# Patient Record
Sex: FEMALE | Race: WHITE | Employment: PART TIME | URBAN - METROPOLITAN AREA
[De-identification: names, ages, dates, MRNs, and addresses within clinical notes are randomized per-mention and may not be internally consistent; named-entity substitution may affect disease eponyms.]

---

## 2018-01-03 PROBLEM — M17.12 OSTEOARTHRITIS OF LEFT KNEE: Status: ACTIVE | Noted: 2018-01-03

## 2019-05-09 ENCOUNTER — APPOINTMENT (OUTPATIENT)
Dept: PHYSICAL THERAPY | Age: 64
End: 2019-05-09

## 2019-05-14 ENCOUNTER — HOSPITAL ENCOUNTER (OUTPATIENT)
Dept: PHYSICAL THERAPY | Age: 64
Discharge: HOME | End: 2019-05-14
Payer: COMMERCIAL

## 2019-05-14 PROCEDURE — 97035 APP MDLTY 1+ULTRASOUND EA 15: CPT

## 2019-05-14 PROCEDURE — 97162 PT EVAL MOD COMPLEX 30 MIN: CPT

## 2019-05-14 NOTE — PROGRESS NOTES
[address] [facility] AT [address], [address] [place] [address] Road  Phone: [phone]   [phone]  PLAN OF CARE / [address] [place] [address] PHYSICAL THERAPY SERVICES  Patient Name: Tabby Hurtado : 1955   Medical   Diagnosis: Right ankle pain [M25.571] Treatment Diagnosis: Right ankle pain [M25.571]   Onset Date: 2019     Referral Source: Kristin Hdz MD Start of [facility]): 2019   Prior Hospitalization: See medical history Provider #: [id number]   Prior Level of Function: Independent   Comorbidities: History of Hepatitis C   Medications: Verified on Patient Summary List   The Plan of Care and following information is based on the information from the initial evaluation.   ===========================================================================================  Assessment / key information:  Patient is a 60year old female with chronic right ankle pain. Patient reports no VALERIE with pain and swelling just starting in February. Patient states that the pain and swelling comes and goes with no known reason. Patient works as a  and visitor . Patient lives in a 2 story dwelling with stairs inside and railings on both sides. Patient reports she likes to walk for exercise. Pain is located right medial ankle; described as sharp/shooting, ache with occasional burning  Current: 4/10; Worse: 7/10 - aggravated with walking long distances; Best: 0/10 - eased with rest, elevation. No numbness or tingling; History of left ankle twisting    Stance: Bilateral pes planus  Bilateral Hallux valgus L>R  Gait: Increased bilateral ER with medial heel whip  ROM: Full bilateral ankle  Strength: 5/5 bilateral ankle  Stability: positive anterior drawer to left;  Increased laxity to bilateral lateral ankle  Balance: Fair EO / Very poor EC  Slight increased inflammation to right medial ankle talocrural joint line; no redness or warmth noted. Patient issued written HEP for ankle stretching and strengthening exercises with balance challenges.      ===========================================================================================  History MEDIUM  Complexity : 1-2 comorbidities / personal factors will impact the outcome/ POC ; Examination MEDIUM Complexity : 3 Standardized tests and measures addressing body structure, function, activity limitation and / or participation in recreation ; Presentation MEDIUM Complexity : Evolving with changing characteristics ; Decision Making MEDIUM Complexity : FOTO score of 26-74; Complexity MEDIUM  Problem List: pain affecting function, impaired gait/ balance, decrease ADL/ functional abilitiies and decrease activity tolerance   Treatment Plan may include any combination of the following: Therapeutic exercise, Neuromuscular re-education, Physical agent/modality, Manual therapy, Patient education, Self Care training and Functional mobility training  Patient / Family readiness to learn indicated by: asking questions, trying to perform skills and interest  Persons(s) to be included in education: patient (P)  Barriers to Learning/Limitations: None  Measures taken:    Patient Goal (s): Stop Pain   Patient self reported health status: good  Rehabilitation Potential: good    · Short Term Goals: To be accomplished in  1-2  weeks: Independent/compliant with long term HEP for ROM, flexibility and strength  Patient to demonstrate normal gait with no deviations. · Long Term Goals: To be accomplished in  8-12  treatments:  1. Improve FOTO outcome score by 10% to indicate a significant improvement in ADL function  2. Pt will verbalize 50% overall improvement in functional ADL's in order to progress towards PLOF. 3. Patient will be independent with long term HEP in order to prepare for DC to home.     Frequency / Duration:   Patient to be seen  2  times per week for 2 weeks:  Patient / Caregiver education and instruction: self care, activity modification and exercises    Therapist Signature: Rosie Rose PT Date: 7/31/5139   Certification Period: 5/14/2019 to 8/13/2019 Time: 7:51 AM   ===========================================================================================  I certify that the above Physical Therapy Services are being furnished while the patient is under my care. I agree with the treatment plan and certify that this therapy is necessary. Physician Signature:        Date:       Time:     Please sign and return to [facility] or you may fax the signed copy to [phone]. Thank you.

## 2019-05-20 ENCOUNTER — HOSPITAL ENCOUNTER (OUTPATIENT)
Dept: PHYSICAL THERAPY | Age: 64
Discharge: HOME | End: 2019-05-20
Payer: COMMERCIAL

## 2019-05-20 PROCEDURE — 97110 THERAPEUTIC EXERCISES: CPT

## 2019-05-20 PROCEDURE — 97035 APP MDLTY 1+ULTRASOUND EA 15: CPT

## 2019-05-20 NOTE — PROGRESS NOTES
PHYSICAL THERAPY - DAILY TREATMENT NOTE    Patient Name: Sabiha Pelayo        Date: 2019  : 1955   yes Patient  Verified  Visit #:   2   of   4  Insurance: Payor: Yomaira Olivarze / Plan: Girish Pottier PPO / Product Type: PPO /      In time: 11:45 Out time: 12:30   Total Treatment Time: 45     Medicare/[facility] Time Tracking (below)   Total Timed Codes (min):  na 1:1 Treatment Time:  na     TREATMENT AREA =  Right ankle pain [M25.571]    SUBJECTIVE  Pain Level (on 0 to 10 scale):  0  / 10   Medication Changes/New allergies or changes in medical history, any new surgeries or procedures?    no  If yes, update Summary List   Subjective Functional Status/Changes:  [x]  No changes reported     Pt reports she had kicked her ankle with the left foot accidentally this weekend and had intense pain that lasted about 2 minutes. Reports otherwise she has had no significant pain. Compliant to HEP.         OBJECTIVE  Modalities Rationale:     decrease inflammation to improve patient's ability to ambulate   min [] Estim, type/location:                                      []  att     []  unatt     []  w/US     []  w/ice    []  w/heat    min []  Mechanical Traction: type/lbs                   []  pro   []  sup   []  int   []  cont    []  before manual    []  after manual   8 min [x]  Ultrasound, settings/location:  phono to R medial ankle, 1MHz, 1.2 W/cm2, continuous to R medila ankle    min []  Iontophoresis w/ dexamethasone, location:                                               []  take home patch       []  in clinic    min []  Ice     []  Heat    location/position:     min []  Vasopneumatic Device, press/temp:     min []  Other:    [x] Skin assessment post-treatment (if applicable):    [x]  intact    []  redness- no adverse reaction     []redness - adverse reaction:        37 min Therapeutic Exercise:  [x]  See flow sheet   Rationale:      increase ROM, increase strength, improve balance and increase proprioception to improve the patients ability to ambulate on unlevel surfaces     Billed With/As:   [x] TE   [] TA   [] Neuro   [] Self Care Patient Education: [x] Review HEP    [] Progressed/Changed HEP based on:   [] positioning   [] body mechanics   [] transfers   [] heat/ice application    [] other:      Other Objective/Functional Measures:    Initiated therex per flow to improve R LE strength/stability as well as balance and proprioception  Noted inc ankle strategy in SLS  ttp over deltoid ligament     Post Treatment Pain Level (on 0 to 10) scale:   0  / 10     ASSESSMENT  Assessment/Changes in Function:     Pt denied pain t/o session indicating good tolerance to TE. Discussed potential for delayed onset muscle soreness, as initiation of new strengthening exercises occurs. Pt encouraged to continue HEP as rx. []  See Progress Note/Recertification   Patient will continue to benefit from skilled PT services to modify and progress therapeutic interventions, address functional mobility deficits, address ROM deficits, address strength deficits, analyze and address soft tissue restrictions, analyze and cue movement patterns, analyze and modify body mechanics/ergonomics, assess and modify postural abnormalities, address imbalance/dizziness and instruct in home and community integration to attain remaining goals.    Progress toward goals / Updated goals:    Pt reports compliance to HEP, progress to STG #1     PLAN  []  Upgrade activities as tolerated yes Continue plan of care   []  Discharge due to :    []  Other:      Therapist: Elda Olivera PT    Date: 5/20/2019 Time: 12:50 PM     Future Appointments   Date Time Provider Frances Mendiola   5/24/2019  1:45 PM [address] PT [facility] [address]   5/28/2019  1:15 PM [address] PT [facility] [address]

## 2019-05-24 ENCOUNTER — HOSPITAL ENCOUNTER (OUTPATIENT)
Dept: PHYSICAL THERAPY | Age: 64
Discharge: HOME | End: 2019-05-24
Payer: COMMERCIAL

## 2019-05-24 PROCEDURE — 97035 APP MDLTY 1+ULTRASOUND EA 15: CPT

## 2019-05-24 PROCEDURE — 97110 THERAPEUTIC EXERCISES: CPT

## 2019-05-24 NOTE — PROGRESS NOTES
PHYSICAL THERAPY - DAILY TREATMENT NOTE    Patient Name: Durga Bala        Date: 2019  : 1955   yes Patient  Verified  Visit #:   3   of   4  Insurance: Payor: Tasia Titus / Plan: Tasia Hancock PPO / Product Type: PPO /      In time: 1:42 Out time: 2:33   Total Treatment Time: 51     Medicare/[facility] Time Tracking (below)   Total Timed Codes (min):  na 1:1 Treatment Time:  na     TREATMENT AREA =  Right ankle pain [M25.571]    SUBJECTIVE  Pain Level (on 0 to 10 scale):  8  / 10   Medication Changes/New allergies or changes in medical history, any new surgeries or procedures?    no  If yes, update Summary List   Subjective Functional Status/Changes:  []  No changes reported     Pt reports the bottom of the foot is more sore today. States she was on her feet all day and did not wear supportive footwear.            OBJECTIVE  Modalities Rationale:     decrease inflammation and decrease pain to improve patient's ability to ambulate   min [] Estim, type/location:                                      []  att     []  unatt     []  w/US     []  w/ice    []  w/heat    min []  Mechanical Traction: type/lbs                   []  pro   []  sup   []  int   []  cont    []  before manual    []  after manual   8 min [x]  Ultrasound, settings/location:  1 Mhz, 1.2 W/cm2, continuous to the R medial ankle    min []  Iontophoresis w/ dexamethasone, location:                                               []  take home patch       []  in clinic    min []  Ice     []  Heat    location/position:     min []  Vasopneumatic Device, press/temp:     min []  Other:    [x] Skin assessment post-treatment (if applicable):    [x]  intact    []  redness- no adverse reaction     []redness - adverse reaction:        43 min Therapeutic Exercise:  [x]  See flow sheet   Rationale:      increase ROM, increase strength, improve balance and increase proprioception to improve the patients ability to ambulate, stand     Billed With/As:   [x] TE [] TA   [] Neuro   [] Self Care Patient Education: [x] Review HEP    [] Progressed/Changed HEP based on:   [] positioning   [] body mechanics   [] transfers   [] heat/ice application    [] other:      Other Objective/Functional Measures:    ttp to plantar calcaneous  Inc TG to level 8 to improve ability to squat     Post Treatment Pain Level (on 0 to 10) scale:   2  / 10     ASSESSMENT  Assessment/Changes in Function:     Pt reporting significant reduction in pain post tx session. Plan for formal reassessment at [facility]     []  See Progress Note/Recertification   Patient will continue to benefit from skilled PT services to modify and progress therapeutic interventions, address functional mobility deficits, address ROM deficits, address strength deficits, analyze and address soft tissue restrictions, analyze and cue movement patterns, analyze and modify body mechanics/ergonomics, address imbalance/dizziness and instruct in home and community integration to attain remaining goals.    Progress toward goals / Updated goals:    Reassess at [facility]  []  Upgrade activities as tolerated yes Continue plan of care   []  Discharge due to :    []  Other:      Therapist: Lennie Grant PT    Date: 5/24/2019 Time: 2:12 PM     Future Appointments   Date Time Provider Frances Mendiola   5/28/2019  1:15 PM [facility]   5/31/2019  4:00 PM [address] Chiny[address]

## 2019-05-28 ENCOUNTER — HOSPITAL ENCOUNTER (OUTPATIENT)
Dept: PHYSICAL THERAPY | Age: 64
Discharge: HOME | End: 2019-05-28
Payer: COMMERCIAL

## 2019-05-28 PROCEDURE — 97110 THERAPEUTIC EXERCISES: CPT

## 2019-05-28 PROCEDURE — 97035 APP MDLTY 1+ULTRASOUND EA 15: CPT

## 2019-05-28 NOTE — PROGRESS NOTES
PHYSICAL THERAPY - DAILY TREATMENT NOTE    Patient Name: Allie Looney        Date: 2019  : 1955   yes Patient  Verified  Visit #:   4   of   4  Insurance: Payor: Alejandro Zarate / Plan: Angela Newby PPO / Product Type: PPO /      In time: 1:20 Out time: 1:58   Total Treatment Time: 38     Medicare/[facility] Time Tracking (below)   Total Timed Codes (min):  na 1:1 Treatment Time:  na     TREATMENT AREA =  Right ankle pain [M25.571]    SUBJECTIVE  Pain Level (on 0 to 10 scale):  1  / 10   Medication Changes/New allergies or changes in medical history, any new surgeries or procedures?    no  If yes, update Summary List   Subjective Functional Status/Changes:  []  No changes reported     Pt reports 85% overall improvement in sx, 8/10 max pain, 4/10 avg pain. Cont to report difficulty/swelling w/ standing/walking (30' tolerance).            OBJECTIVE  Modalities Rationale:     decrease inflammation and decrease pain to improve patient's ability to ambulate, stand   min [] Estim, type/location:                                      []  att     []  unatt     []  w/US     []  w/ice    []  w/heat    min []  Mechanical Traction: type/lbs                   []  pro   []  sup   []  int   []  cont    []  before manual    []  after manual   8 min [x]  Ultrasound, settings/location:  1.0 MHz, 1.2 w/cm2, continuous to the R medial ankle    min []  Iontophoresis w/ dexamethasone, location:                                               []  take home patch       []  in clinic    min []  Ice     []  Heat    location/position:     min []  Vasopneumatic Device, press/temp:     min []  Other:    [x] Skin assessment post-treatment (if applicable):    [x]  intact    []  redness- no adverse reaction     []redness - adverse reaction:        30 min Therapeutic Exercise:  [x]  See flow sheet   Rationale:      increase ROM, increase strength, improve balance and increase proprioception to improve the patients ability to ambulate prolonged distances     Billed With/As:   [x] TE   [] TA   [] Neuro   [] Self Care Patient Education: [x] Review HEP    [] Progressed/Changed HEP based on:   [] positioning   [] body mechanics   [] transfers   [] heat/ice application    [] other:      Other Objective/Functional Measures: Ankle AROM WNL all planes, pain reported medial ankle w/ ankle IV/EV  Ankle strategy noted in SLS w/ EC, limited to <7 sec prior to LOB  FOTO 69/100     Post Treatment Pain Level (on 0 to 10) scale:   1  / 10     ASSESSMENT  Assessment/Changes in Function:     See PN     [x]  See Progress Note/Recertification   Patient will continue to benefit from skilled PT services to modify and progress therapeutic interventions, address functional mobility deficits, address ROM deficits, address strength deficits, analyze and address soft tissue restrictions, analyze and cue movement patterns, analyze and modify body mechanics/ergonomics, assess and modify postural abnormalities, address imbalance/dizziness and instruct in home and community integration to attain remaining goals.    Progress toward goals / Updated goals:    See PN     PLAN  []  Upgrade activities as tolerated yes Continue plan of care   []  Discharge due to :    []  Other:      Therapist: Kelly Renee, PT    Date: 5/28/2019 Time: 1:43 PM     Future Appointments   Date Time Provider Frances Mendiola   5/31/2019  4:00 PM [address] [address]

## 2019-05-28 NOTE — PROGRESS NOTES
[address] [place] [address] PHYSICAL THERAPY  [facility], [place], [address] [facility] [address] - Phone: [phone]  Fax: [phone]  PROGRESS NOTE  Patient Name: Jennifer Wheeler : 1955   Treatment/Medical Diagnosis: Right ankle pain [M25.571]   Referral Source: Caroline Ramos MD     Date of Initial Visit: 19 Attended Visits: 4 Missed Visits: 0     SUMMARY OF TREATMENT  Pt has attended PT 2x/wk for a total of 2 weeks for the tx of R medial ankle sprain. PT tx has consisted of therex to improve R LE flexibility, balance, proprioception, and strength/stability, as well as phonophoresis to reduce R medial ankle pain and inflammation. CURRENT STATUS  Ms. Fernando Silva has made steady progress towards goals, currently reporting 85% overall improvement in sx. Pt currently c/o 8/10 max pain, occurring predominately when walking or standing prolonged periods of time, as wel as when wearing non-supportive footwear. Pt notes her average daily pain of 4/10. Pt continues w/ intermittent swelling and ttp to the R deltoid ligament. Reports standing/walking tolerance of 30' prior to onset of significant pain. R ankle AROM WNL all planes, pain reported to medial ankle w/ ankle IV and EV PROM. Cont w/ poor SLS w/ EC, limited to <7 seconds prior to LOB. Current FOTO outcome score 69%, GROC +4.     Goal/Measure of Progress Goal Met? 1. Improve FOTO outcome score by 10% to show significant improvement in ADL function   Status at last Eval: 64% Current Status: 69% progress   2. Pt will verbalize 50% overall improvement in functional ADLs in order to progress towards PLOF   Status at last Eval: n/a Current Status: Reporting 85% overall improvement yes   3. Pt will be I w/ long-term HEP in order to prepare for DC to home   Status at last Eval: Established HEP on IE Current Status: Reports intermittent compliance to HEP progress     New Goals to be achieved in __2-4__  weeks:  1. Achieve LTG #1  2.  Pt will maintain SLS on dynamic surface x 20\" w/o LOB to improve ability to ambulate safely on unlevel surfaces  3. Achieve LTG #3    RECOMMENDATIONS  Pt could potentially benefit from continued PT 2x/wk for additional 2-4 weeks to further improve upon R ankle strength/stability and balance/proprioception. Pt has been advised to f/u with your office for further assessment of sx. Please advise. Thank you for this referral.     If you have any questions/comments please contact us directly at [phone]. Thank you for allowing us to assist in the care of your patient. Therapist Signature: Darlene Roa, PT Date: 5/28/2019     Time: 1:48 PM   NOTE TO PHYSICIAN:  PLEASE COMPLETE THE ORDERS BELOW AND FAX TO   [facility] Physical Therapy: [phone]  If you are unable to process this request in 24 hours please contact our office: [phone]    ___ I have read the above report and request that my patient continue as recommended.   ___ I have read the above report and request that my patient continue therapy with the following changes/special instructions:_________________________________________________________   ___ I have read the above report and request that my patient be discharged from therapy.      Physician Signature:        Date:       Time:

## 2019-06-25 NOTE — PROGRESS NOTES
[facility]  [facility], [place], [address] [place] [address] Road - Phone: [phone]  Fax: [phone]  DISCHARGE SUMMARY  Patient Name: Marcelle Fields : 1955   Treatment/Medical Diagnosis: Right ankle pain [M25.571]   Referral Source: Ryan Mendez MD     Date of Initial Visit: 19 Attended Visits: 4 Missed Visits: 0     SUMMARY OF TREATMENT  Pt has attended PT 2x/wk for a total of 2 weeks for the tx of R medial ankle sprain. PT tx has consisted of therex to improve R LE flexibility, balance, proprioception, and strength/stability, as well as phonophoresis to reduce R medial ankle pain and inflammation    CURRENT STATUS  Pt attended 4 total visits of PT as per PT referral. Pt was advised to f/u with your office to determine DC from PT vs continuation of care. At this time we haven't heard back from pt regarding updated status, therefore pt is discharged from our care. Thank you for this referral.    RECOMMENDATIONS  Other: Discontinue therapy; program complete    If you have any questions/comments please contact us directly at [phone]. Thank you for allowing us to assist in the care of your patient.     Therapist Signature: Kaye Gibson PT Date: 19     Time: 1:00 PM

## 2019-08-05 PROBLEM — N17.9 ACUTE RENAL FAILURE (ARF) (HCC): Status: ACTIVE | Noted: 2019-08-05

## 2022-04-20 PROBLEM — I26.99 PULMONARY EMBOLI (HCC): Status: ACTIVE | Noted: 2022-04-20

## 2025-05-29 NOTE — PROGRESS NOTES
PT EVALUATION/ DAILY TREATMENT NOTE    Patient Name: Tabby Estimable  Date:2019  : 1955  [x]  Patient  Verified  Payor: Osvaldo Peace / Plan: Dulce Lee PPO / Product Type: PPO /    In time:8:30  Out time:9:10  Total Treatment Time (min): 40  Total Timed Codes (min): 40  1:1 Treatment Time ([facility] only): NA   Visit #: 1 of 4      SUBJECTIVE  Pain Level (0-10 scale): 4/10    Physical Therapy Evaluation  - Foot and Ankle    Patient is a 60year old female with chronic right ankle pain. Patient reports no VALERIE with pain and swelling just starting in February. Patient states that the pain and swelling comes and goes with no known reason. Patient works as a  and visitor . Patient lives in a 2 story dwelling with stairs inside and railings on both sides. Patient reports she likes to walk for exercise.     Pain is located right medial ankle; described as sharp/shooting, ache with occasional burning  Current: 4/10; Worse: 7/10 - aggravated with walking long distances; Best: 0/10 - eased with rest, elevation.     No numbness or tingling; History of left ankle twisting     Stance: Bilateral pes planus  Bilateral Hallux valgus L>R  Gait: Increased bilateral ER with medial heel whip  ROM: Full bilateral ankle  Strength: 5/5 bilateral ankle  Stability: positive anterior drawer to left; Increased laxity to bilateral lateral ankle  Balance: Fair EO / Very poor EC  Slight increased inflammation to right medial ankle talocrural joint line; no redness or warmth noted.     Patient issued written HEP for ankle stretching and strengthening exercises with balance challenges.         OBJECTIVE TREATMENT:  Modality (rationale):   []  E-Stim: type _ x _ min     []att   []unatt   []w/US   []w/ice   []w/heat  [x]  Ultrasound: [x]cont   []pulse    1.2_ W/cm2 x 8  min   [x]1MHz   []3MHz  []  Iontophoresis: []take home patch w/ dexamethazone    []40mA   []80mA                               []_ mA min w/: []dexamethazone   []other:_  []  Ice pack _  min     [] Hot pack _  min     [] Paraffin _  min  []  Other:       Other Objective/Functional Measures:   Pain Level (0-10 scale) post treatment: 4/10    ASSESSMENT  [x]  See Plan of Care    PLAN  [x] Other:See Plan of Care_      Zhou Bray, PT 5/14/2019  10:37 AM Home